# Patient Record
Sex: FEMALE | URBAN - METROPOLITAN AREA
[De-identification: names, ages, dates, MRNs, and addresses within clinical notes are randomized per-mention and may not be internally consistent; named-entity substitution may affect disease eponyms.]

---

## 2021-06-29 ENCOUNTER — APPOINTMENT (RX ONLY)
Dept: URBAN - METROPOLITAN AREA CLINIC 74 | Facility: CLINIC | Age: 41
Setting detail: DERMATOLOGY
End: 2021-06-29

## 2021-06-29 DIAGNOSIS — L65.9 NONSCARRING HAIR LOSS, UNSPECIFIED: ICD-10-CM

## 2021-06-29 DIAGNOSIS — L30.9 DERMATITIS, UNSPECIFIED: ICD-10-CM

## 2021-06-29 PROCEDURE — ? DIAGNOSIS COMMENT

## 2021-06-29 PROCEDURE — 11104 PUNCH BX SKIN SINGLE LESION: CPT

## 2021-06-29 PROCEDURE — ? COUNSELING

## 2021-06-29 PROCEDURE — 99202 OFFICE O/P NEW SF 15 MIN: CPT | Mod: 25

## 2021-06-29 PROCEDURE — ? PATIENT SPECIFIC COUNSELING

## 2021-06-29 PROCEDURE — ? BIOPSY BY PUNCH METHOD

## 2021-06-29 ASSESSMENT — LOCATION DETAILED DESCRIPTION DERM
LOCATION DETAILED: POSTERIOR MID-PARIETAL SCALP
LOCATION DETAILED: RIGHT SUPERIOR POSTERIOR PARIETAL SCALP

## 2021-06-29 ASSESSMENT — LOCATION ZONE DERM: LOCATION ZONE: SCALP

## 2021-06-29 ASSESSMENT — LOCATION SIMPLE DESCRIPTION DERM: LOCATION SIMPLE: POSTERIOR SCALP

## 2021-06-29 NOTE — PROCEDURE: DIAGNOSIS COMMENT
Comment: - onset years ago and treated with various oral abx, no improvement \\n- bx to confirm, will need labs if + for tinea capitis
Render Risk Assessment In Note?: no
Detail Level: Simple

## 2021-06-29 NOTE — PROCEDURE: PATIENT SPECIFIC COUNSELING
Detail Level: Zone
- pt reports lump on scalp present x few yrs, now scaly, pink, and itchy. Pt currently taking amoxicillin.\\n- pt opted for punch biopsy. R/b/se\\n- ror sent to pt via email to retrieve recent labs from pcp \\n- RTC 14 days
- reports hair loss present in area x2-3 yrs\\n- will retrieve labs from pcp

## 2021-06-29 NOTE — PROCEDURE: BIOPSY BY PUNCH METHOD
Detail Level: Detailed
Was A Bandage Applied: Yes
Punch Size In Mm: 4
Biopsy Type: H and E
Anesthesia Type: 1% lidocaine with epinephrine
Anesthesia Volume In Cc (Will Not Render If 0): 0.5
Additional Anesthesia Volume In Cc (Will Not Render If 0): 0
Hemostasis: Pressure
Epidermal Sutures: 4-0 Prolene
Number Of Epidermal Sutures (Optional): 1
Wound Care: Petrolatum
Dressing: bandage
Suture Removal: 14 days
Patient Will Remove Sutures At Home?: No
Lab: [id number]
Lab Facility: [id number]
Consent: Written consent was obtained and risks were reviewed including but not limited to scarring, infection, bleeding, scabbing, incomplete removal, nerve damage and allergy to anesthesia.
Post-Care Instructions: I reviewed with the patient in detail post-care instructions. Patient is to keep the biopsy site dry overnight, and then apply bacitracin twice daily until healed. Patient may apply hydrogen peroxide soaks to remove any crusting.
Home Suture Removal Text: Patient was provided a home suture removal kit and will remove their sutures at home.  If they have any questions or difficulties they will call the office.
Notification Instructions: Patient will be notified of biopsy results. However, patient instructed to call the office if not contacted within 2 weeks.
Billing Type: Third-Party Bill
Information: Selecting Yes will display possible errors in your note based on the variables you have selected. This validation is only offered as a suggestion for you. PLEASE NOTE THAT THE VALIDATION TEXT WILL BE REMOVED WHEN YOU FINALIZE YOUR NOTE. IF YOU WANT TO FAX A PRELIMINARY NOTE YOU WILL NEED TO TOGGLE THIS TO 'NO' IF YOU DO NOT WANT IT IN YOUR FAXED NOTE.

## 2021-06-29 NOTE — HPI: BUMPS
How Severe Are Your Bumps?: moderate
Have Your Bumps Been Treated?: not been treated
Is This A New Presentation, Or A Follow-Up?: Bumps
Andria Ovalle MD  Authorized by: Andria Ovalle 
Additional History: Pt reports ball size bump on scalp. Pt also losing hair in the area. Pt reports taking antibiotics for one week.
[address] [place], [place] [id number] [phone]    

## 2021-07-09 ENCOUNTER — APPOINTMENT (RX ONLY)
Dept: URBAN - METROPOLITAN AREA CLINIC 74 | Facility: CLINIC | Age: 41
Setting detail: DERMATOLOGY
End: 2021-07-09

## 2021-07-09 DIAGNOSIS — L66.8 OTHER CICATRICIAL ALOPECIA: ICD-10-CM

## 2021-07-09 DIAGNOSIS — L66.81 CENTRAL CENTRIFUGAL CICATRICIAL ALOPECIA: ICD-10-CM

## 2021-07-09 PROCEDURE — ? COUNSELING

## 2021-07-09 PROCEDURE — ? TREATMENT REGIMEN

## 2021-07-09 PROCEDURE — ? DIAGNOSIS COMMENT

## 2021-07-09 PROCEDURE — 99214 OFFICE O/P EST MOD 30 MIN: CPT

## 2021-07-09 PROCEDURE — ? PRESCRIPTION

## 2021-07-09 PROCEDURE — ? ADDITIONAL NOTES

## 2021-07-09 RX ORDER — CLOBETASOL PROPIONATE 0.5 MG/ML
SOLUTION TOPICAL QHS
Qty: 1 | Refills: 2 | Status: ERX | COMMUNITY
Start: 2021-07-09

## 2021-07-09 RX ORDER — DOXYCYCLINE 100 MG/1
CAPSULE ORAL
Qty: 28 | Refills: 0 | Status: ERX | COMMUNITY
Start: 2021-07-09

## 2021-07-09 RX ADMIN — CLOBETASOL PROPIONATE: 0.5 SOLUTION TOPICAL at 00:00

## 2021-07-09 RX ADMIN — DOXYCYCLINE: 100 CAPSULE ORAL at 00:00

## 2021-07-09 ASSESSMENT — LOCATION DETAILED DESCRIPTION DERM: LOCATION DETAILED: LEFT SUPERIOR PARIETAL SCALP

## 2021-07-09 ASSESSMENT — LOCATION ZONE DERM: LOCATION ZONE: SCALP

## 2021-07-09 ASSESSMENT — LOCATION SIMPLE DESCRIPTION DERM: LOCATION SIMPLE: SCALP

## 2021-07-09 NOTE — HPI: SURGICAL COMPLICATION (SUTURE REACTION)
How Severe Is It?: moderate
Is This A New Presentation, Or A Follow-Up?: Suture Reaction
Additional History: Pt presents for possible suture rxn. Pt reports migraines + n/v since LOV. Pt reports tx w/ Tylenol and Advil w/o benefit. Pt reports a hx of severe migraines for which she was admitted to the hospital in 2005 but does not recall any possible triggers.

## 2021-07-09 NOTE — PROCEDURE: MIPS QUALITY
Additional Notes: Pt received COVID vaccination
Quality 110: Preventive Care And Screening: Influenza Immunization: Influenza Immunization Administered during Influenza season
Quality 130: Documentation Of Current Medications In The Medical Record: Current Medications Documented
Detail Level: Detailed

## 2021-07-09 NOTE — PROCEDURE: ADDITIONAL NOTES
Detail Level: Simple
Additional Notes: CCCA counseling:\\n- lengthy discussed discussing biopsy and prognosis with patient\\n- advised is a scarring type alopecia and can lead to permanent hair loss which can be disfiguring, distressing and lead to low self esteem and social isolation\\n- advised patient can be very difficult to treat and often can require immunosuppressant medications to control \\n- treatment initially will be with oral antibiotics which can have significant side effects along with intra-lesional kenalog with topical corticosteroids\\n- unresponsive cases may require oral anti-malarial medication plaquenil which does require ophthalmologic evaluations and laboratory studies.  Immunosuppressant medications including but not limited to cyclosporine, cellcept, methotrexate or oral corticosteroids may be required, all of which can have significant side effects and do require laboratory monitoring. -Improper use of prescribed topical and oral corticosteroid has a ongoing high risk of permanent skin atrophy, discoloration and suppression of the hypothalamo–pituitary–adrenal (HPA) axis by excessive and/or unguided  use of the topical steroids, thus counseling and close monitoring is necessary 
Render Risk Assessment In Note?: no
Additional Notes: - ryan advised pt f/u with PCP for referral to Neurology for Headache\\n- do not think is associated wtih CCCA\\n- pt reports she went to the ER an had a normal CT scan

## 2021-07-09 NOTE — PROCEDURE: COUNSELING
Patient Specific Counseling (Will Not Stick From Patient To Patient): - bx 6/29/21 proven CCCA; s/r done today\\n- disc not infx but inflammation \\n- pt reports severe migraines + n/v since LOV\\n- pt reports visited [facility]; CT nml \\n- disc migraines can be complicated but tx inflammation may help\\n- recommended consult w/ neuro\\n- pt reports PCP rx medication which only helps temporarily after taking \\n- disc oral abx / topical steroids (r/b/se)\\n- start TSal shampoo- apply to scalp tiw; samples provided \\n- start DCN 100mg- take 1 capsule 1 po bid x 2 wks; monitor for se\\n- start clobetasol 0.05% scalp sol- AAA on scalp bid x 1 wk, break x 1 wk, repeat cycle\\n- RTC 6 mo
Detail Level: Detailed

## 2021-07-09 NOTE — PROCEDURE: TREATMENT REGIMEN
Detail Level: Zone
Samples Given: start TSal shampoo- apply to scalp tiw
Initiate Treatment: DCN 100mg- take 1 capsule po bid x 2 wks\\nclobetasol 0.05% scalp sol- AAA on scalp bid x 1 wk, break x 1 wk, repeat cycle
no wheezing/no pleuritic chest pain/no hemoptysis

## 2021-07-09 NOTE — PROCEDURE: DIAGNOSIS COMMENT
Render Risk Assessment In Note?: no
Comment: - advised patient central area involved is scar tissue\\n- do not recommend excision in office\\n- likely areas involved will not recover and result in permanent hairloss
Detail Level: Simple
Comment: - Bx proven Central Centrifugal cicatricial alopecia\\n- negative PAS, so no signs of tinea capitis

## 2021-08-05 ENCOUNTER — APPOINTMENT (RX ONLY)
Dept: URBAN - METROPOLITAN AREA CLINIC 74 | Facility: CLINIC | Age: 41
Setting detail: DERMATOLOGY
End: 2021-08-05

## 2021-08-05 DIAGNOSIS — L66.8 OTHER CICATRICIAL ALOPECIA: ICD-10-CM | Status: INADEQUATELY CONTROLLED

## 2021-08-05 DIAGNOSIS — L66.81 CENTRAL CENTRIFUGAL CICATRICIAL ALOPECIA: ICD-10-CM | Status: INADEQUATELY CONTROLLED

## 2021-08-05 DIAGNOSIS — L44.8 OTHER SPECIFIED PAPULOSQUAMOUS DISORDERS: ICD-10-CM

## 2021-08-05 DIAGNOSIS — B07.8 OTHER VIRAL WARTS: ICD-10-CM

## 2021-08-05 PROCEDURE — 17110 DESTRUCTION B9 LES UP TO 14: CPT

## 2021-08-05 PROCEDURE — ? LIQUID NITROGEN

## 2021-08-05 PROCEDURE — ? COUNSELING

## 2021-08-05 PROCEDURE — ? PATIENT SPECIFIC COUNSELING

## 2021-08-05 PROCEDURE — ? TREATMENT REGIMEN

## 2021-08-05 PROCEDURE — ? PRESCRIPTION

## 2021-08-05 PROCEDURE — 99214 OFFICE O/P EST MOD 30 MIN: CPT | Mod: 25

## 2021-08-05 PROCEDURE — ? DIAGNOSIS COMMENT

## 2021-08-05 RX ORDER — DOXYCYCLINE 100 MG/1
CAPSULE ORAL
Qty: 60 | Refills: 0 | Status: ERX

## 2021-08-05 RX ORDER — KETOCONAZOLE 20 MG/ML
SHAMPOO, SUSPENSION TOPICAL
Qty: 1 | Refills: 3 | Status: ERX | COMMUNITY
Start: 2021-08-05

## 2021-08-05 RX ADMIN — KETOCONAZOLE: 20 SHAMPOO, SUSPENSION TOPICAL at 00:00

## 2021-08-05 ASSESSMENT — LOCATION DETAILED DESCRIPTION DERM
LOCATION DETAILED: RIGHT INDEX FINGERTIP
LOCATION DETAILED: LEFT SUPERIOR PARIETAL SCALP
LOCATION DETAILED: RIGHT DISTAL PALMAR INDEX FINGER
LOCATION DETAILED: POSTERIOR MID-PARIETAL SCALP
LOCATION DETAILED: RIGHT INDEX DISTAL INTERPHALANGEAL JOINT
LOCATION DETAILED: RIGHT DISTAL RADIAL PALMAR INDEX FINGER

## 2021-08-05 ASSESSMENT — LOCATION SIMPLE DESCRIPTION DERM
LOCATION SIMPLE: RIGHT INDEX FINGER
LOCATION SIMPLE: SCALP
LOCATION SIMPLE: POSTERIOR SCALP

## 2021-08-05 ASSESSMENT — LOCATION ZONE DERM
LOCATION ZONE: SCALP
LOCATION ZONE: FINGER

## 2021-08-05 ASSESSMENT — SEVERITY ASSESSMENT: HOW SEVERE IS THIS PATIENT'S CONDITION?: MODERATE

## 2021-08-05 NOTE — PROCEDURE: DIAGNOSIS COMMENT
Detail Level: Simple
Comment: - pt flaring and reports drainage on scalp\\n- no change in alopecia, only inflammation\\n- no signs of infection\\n- start doxy 100mg po bid with food\\n- SE reviewed with pt \\n- she has not started clobetasol sol, start bid x 7-10 days, break for a week, then repeat
Render Risk Assessment In Note?: no

## 2021-08-05 NOTE — PROCEDURE: TREATMENT REGIMEN
Detail Level: Zone
Otc Regimen: TSal shampoo- apply to scalp tiw
Initiate Treatment: DCN 100mg- take 1 capsule po bid x 1 mo\\nclobetasol 0.05% scalp sol- AAA on scalp bid x 1 wk, break x 1 wk, repeat cycle
Otc Regimen: Tsal shampoo tiw
Initiate Treatment: Ketoconazole 2% shampoo. Let sit on scalp for 5-10 minutes every other day

## 2021-08-05 NOTE — PROCEDURE: LIQUID NITROGEN
Add 52 Modifier (Optional): no
Medical Necessity Information: It is in your best interest to select a reason for this procedure from the list below. All of these items fulfill various CMS LCD requirements except the new and changing color options.
Consent: The patient's consent was obtained including but not limited to risks of crusting, scabbing, blistering, scarring, darker or lighter pigmentary change, recurrence, incomplete removal and infection.
Medical Necessity Clause: This procedure was medically necessary because the lesions that were treated were:
Post-Care Instructions: I reviewed with the patient in detail post-care instructions. Patient is to wear sunprotection, and avoid picking at any of the treated lesions. Pt may apply Vaseline to crusted or scabbing areas.
Show Topical Anesthesia Variable?: Yes
Detail Level: Detailed

## 2021-08-05 NOTE — PROCEDURE: PATIENT SPECIFIC COUNSELING
Detail Level: Zone
- pt reports that she was unable to  Clobetasol 0.05% scalp sol; will ask pharmacy \\n- disc that the thick plaque formation on scalp is due to inflammation\\n- disc that Clobetasol and Tsal shampoo will help decrease inflammation \\n- pt denies picking at area\\n- will restart DCN bid x 1 mo